# Patient Record
Sex: MALE | Race: BLACK OR AFRICAN AMERICAN | NOT HISPANIC OR LATINO | URBAN - METROPOLITAN AREA
[De-identification: names, ages, dates, MRNs, and addresses within clinical notes are randomized per-mention and may not be internally consistent; named-entity substitution may affect disease eponyms.]

---

## 2024-04-25 ENCOUNTER — APPOINTMENT (RX ONLY)
Dept: URBAN - METROPOLITAN AREA CLINIC 33 | Facility: CLINIC | Age: 74
Setting detail: DERMATOLOGY
End: 2024-04-25

## 2024-04-25 DIAGNOSIS — B86 SCABIES: ICD-10-CM

## 2024-04-25 PROBLEM — L30.9 DERMATITIS, UNSPECIFIED: Status: ACTIVE | Noted: 2024-04-25

## 2024-04-25 PROCEDURE — ? PRESCRIPTION

## 2024-04-25 PROCEDURE — ? COUNSELING

## 2024-04-25 PROCEDURE — 99203 OFFICE O/P NEW LOW 30 MIN: CPT

## 2024-04-25 PROCEDURE — ? PRESCRIPTION MEDICATION MANAGEMENT

## 2024-04-25 RX ORDER — PERMETHRIN 50 MG/G
CREAM TOPICAL QD
Qty: 120 | Refills: 1 | Status: ERX

## 2024-04-25 RX ORDER — IVERMECTIN 3 MG/1
TABLET ORAL
Qty: 10 | Refills: 0 | Status: ERX | COMMUNITY
Start: 2024-04-25

## 2024-04-25 RX ADMIN — IVERMECTIN: 3 TABLET ORAL at 00:00

## 2024-04-25 ASSESSMENT — LOCATION DETAILED DESCRIPTION DERM: LOCATION DETAILED: SUPERIOR LUMBAR SPINE

## 2024-04-25 ASSESSMENT — LOCATION ZONE DERM: LOCATION ZONE: TRUNK

## 2024-04-25 ASSESSMENT — LOCATION SIMPLE DESCRIPTION DERM: LOCATION SIMPLE: LOWER BACK

## 2024-04-25 NOTE — PROCEDURE: PRESCRIPTION MEDICATION MANAGEMENT
Detail Level: Simple
Plan: Discussed low suspicion for scabies given history and exam findings.\\nRecommended patient contact  since he has \"seen\" insects. Patient became agitated by this suggestion and thrust a plastic bag of exhausted permethrin cream tubes in front of provider and requested oral ivermectin.\\nGiven patient's suspicions/concerns have been validated by other providers (prior topical permethrin prescriptions) and his insistence we treat with a systemic option, will treat as outlined above. Would consider referral to psychiatry if same concerns persist.
Render In Strict Bullet Format?: No
Initiate Treatment: ivermectin as directed\\n\\npermethrin 5 % cream as directed\\n\\nTriamcinolone ointment (PRN between permethrin treatments)

## 2024-04-25 NOTE — HPI: RASH
How Severe Is Your Rash?: mild
Is This A New Presentation, Or A Follow-Up?: Rash
Additional History: Patient states he has seen insects \"crawl out of holes\" on his arms. He has also seen the bugs race down his arm under his skin when he applied a heating pad. Patient states he has treated with topical permethrin cream but would like Rx ivermectin. He denies itching at this time.

## 2024-04-30 RX ORDER — TRIAMCINOLONE ACETONIDE 1 MG/G
OINTMENT TOPICAL BID
Qty: 30 | Refills: 0 | Status: ERX | COMMUNITY
Start: 2024-04-30

## 2024-04-30 RX ADMIN — TRIAMCINOLONE ACETONIDE: 1 OINTMENT TOPICAL at 00:00

## 2024-05-16 ENCOUNTER — APPOINTMENT (RX ONLY)
Dept: URBAN - METROPOLITAN AREA CLINIC 33 | Facility: CLINIC | Age: 74
Setting detail: DERMATOLOGY
End: 2024-05-16

## 2024-05-16 DIAGNOSIS — F45.8 OTHER SOMATOFORM DISORDERS: ICD-10-CM

## 2024-05-16 PROCEDURE — ? COUNSELING

## 2024-05-16 PROCEDURE — 99212 OFFICE O/P EST SF 10 MIN: CPT

## 2024-05-16 PROCEDURE — ? ADDITIONAL NOTES

## 2024-05-16 ASSESSMENT — LOCATION SIMPLE DESCRIPTION DERM: LOCATION SIMPLE: RIGHT FOREARM

## 2024-05-16 ASSESSMENT — LOCATION DETAILED DESCRIPTION DERM: LOCATION DETAILED: RIGHT VENTRAL DISTAL FOREARM

## 2024-05-16 ASSESSMENT — LOCATION ZONE DERM: LOCATION ZONE: ARM

## 2024-05-16 NOTE — PROCEDURE: ADDITIONAL NOTES
Render Risk Assessment In Note?: no
Additional Notes: 4/24 Discussed low suspicion for scabies given history and exam findings.\\nRecommended patient contact  since he has \"seen\" insects. Patient became agitated by this suggestion and thrust a plastic bag of exhausted permethrin cream tubes in front of provider and requested oral ivermectin.\\nGiven patient's suspicions/concerns have been validated by other providers (prior topical permethrin prescriptions) and his insistence we treat with a systemic option, will treat as outlined above. Would consider referral to psychiatry if same concerns persist.\\n\\n5/24 Problem unchanged. Patient remains convinced of infestation. He describes in detail, \"insects\" coming out of various holes in his arms. He describes swatting larger ones and sweeping up \"piles\" of insects at his residence. He has replaced his washing machine and vacuum out of concern for this problem.\\nPmaryann's wife is decreased for several years. He lives alone though feels safe at home and denies any thoughts of harming himself. Discussed I have no additional treatment options for these circumstances though I recommended and offered to refer to psychiatry for further evaluation and assistance.\\n\\nPatient defers referral today. 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 He indicated he could easily reach out to them if he felt their assistance could be beneficial.\\n\\nPrior to this appointment, our office contacted patient and suggested he bring a trusted family member or friend to this appointment - patient deferred.
Detail Level: Simple

## 2024-06-19 ENCOUNTER — APPOINTMENT (RX ONLY)
Dept: URBAN - METROPOLITAN AREA CLINIC 46 | Facility: CLINIC | Age: 74
Setting detail: DERMATOLOGY
End: 2024-06-19

## 2024-06-19 DIAGNOSIS — L50.3 DERMATOGRAPHIC URTICARIA: ICD-10-CM | Status: INADEQUATELY CONTROLLED

## 2024-06-19 DIAGNOSIS — F42.4 EXCORIATION (SKIN-PICKING) DISORDER: ICD-10-CM | Status: INADEQUATELY CONTROLLED

## 2024-06-19 PROBLEM — L30.9 DERMATITIS, UNSPECIFIED: Status: ACTIVE | Noted: 2024-06-19

## 2024-06-19 PROCEDURE — ? MINERAL OIL PREP

## 2024-06-19 PROCEDURE — ? COUNSELING

## 2024-06-19 PROCEDURE — 87210 SMEAR WET MOUNT SALINE/INK: CPT

## 2024-06-19 PROCEDURE — ? ADDITIONAL NOTES

## 2024-06-19 PROCEDURE — 99214 OFFICE O/P EST MOD 30 MIN: CPT

## 2024-06-19 PROCEDURE — ? PRESCRIPTION MEDICATION MANAGEMENT

## 2024-06-19 PROCEDURE — ? PHOTO-DOCUMENTATION

## 2024-06-19 ASSESSMENT — LOCATION ZONE DERM
LOCATION ZONE: LEG
LOCATION ZONE: ARM

## 2024-06-19 ASSESSMENT — LOCATION DETAILED DESCRIPTION DERM
LOCATION DETAILED: RIGHT VENTRAL DISTAL FOREARM
LOCATION DETAILED: RIGHT LATERAL PROXIMAL UPPER ARM
LOCATION DETAILED: LEFT KNEE
LOCATION DETAILED: LEFT VENTRAL DISTAL FOREARM

## 2024-06-19 ASSESSMENT — LOCATION SIMPLE DESCRIPTION DERM
LOCATION SIMPLE: LEFT KNEE
LOCATION SIMPLE: RIGHT UPPER ARM
LOCATION SIMPLE: LEFT FOREARM
LOCATION SIMPLE: RIGHT FOREARM

## 2024-06-19 NOTE — PROCEDURE: MINERAL OIL PREP
Detail Level: Detailed
Cpt Desired: 52145
Mineral Oil Prep Intro Text (From The.....): A mineral oil prep was ordered and evaluated from the
Mineral Oil Prep Procedure Text (Tissue Harvesting Technique): A 15-blade scalpel was used to scrape the skin. The skin scrapings were placed on a glass slide, covered with a coverslip and mineral oil was applied.

## 2024-06-19 NOTE — PROCEDURE: ADDITIONAL NOTES
Render Risk Assessment In Note?: no
Additional Notes: Dermatographism is a result of a histamine reaction, it can be itchy and cause skin welping. The dermatographism can give the feeling of crawling and itching. \\n\\nClaritin (loratidine) and Pepsid (famotedine) take both pills twice daily. Pepsid is marketed for the stomach, but it is an antihistamine that has receptors in the skin. \\n\\n\\nThere are two mites that get into the skin. Scabies are microscopic, and do not get large enough to smack off into a wall. \\n\\nDemodex are normal skin mites that live on our skin, they are also microscopic.\\n\\nWe will provide pt glass slides for him to put the bugs he finds to bring back to us for examination. He can just put clear tape over the sample on the slide. Pt can use the tape to  the animals that crawl on his skin and put on the glass slide. \\n\\Raymundo the mean while pt will take the antihistamines and we will see how that helps the dermatographism.
Detail Level: Detailed
Additional Notes: We applied mineral oil to soften the skin on the knee and right arm before performing MAURA.\\n\\nKOH will show us if the skin has any insects, eggs, and insect poop. \\n\\nThe scrapings (KOH) were both negative for mites, eggs, and poop. \\n\\nBoth the arm and the knee currently appear as scratches on the skin, no bugs are present on exam day. \\n\\nSince no bugs are presents we will treat pt for the dermatographism, which was present.

## 2024-06-19 NOTE — PROCEDURE: PRESCRIPTION MEDICATION MANAGEMENT
Detail Level: Zone
Plan: OK to apply the permethrin focally to active areas as needed
Render In Strict Bullet Format?: No